# Patient Record
Sex: FEMALE | Race: WHITE | NOT HISPANIC OR LATINO | Employment: UNEMPLOYED | URBAN - METROPOLITAN AREA
[De-identification: names, ages, dates, MRNs, and addresses within clinical notes are randomized per-mention and may not be internally consistent; named-entity substitution may affect disease eponyms.]

---
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
## 2024-10-01 PROBLEM — E66.3 OVERWEIGHT: Status: ACTIVE | Noted: 2019-11-24

## 2024-11-10 ENCOUNTER — HEALTH MAINTENANCE LETTER (OUTPATIENT)
Age: 19
End: 2024-11-10

## 2025-05-12 NOTE — PROGRESS NOTES
SUBJECTIVE:                                                    Reina Hutchinson is a 12 year old female, here for a routine health maintenance visit,   accompanied by her mother and sister.    Patient was roomed by: Marybel Knapp CMA    Do you have any forms to be completed?  YES    SOCIAL HISTORY  Family members in house: mother and father and 2 sisters  Language(s) spoken at home: English  Recent family changes/social stressors: none noted    SAFETY/HEALTH RISKS  TB exposure:  No  Cardiac risk assessment: none  Do you monitor your child's screen use?  Yes    DENTAL  Dental health HIGH risk factors: none  Water source:  WELL WATER          SPORTS QUESTIONNAIRE:  ======================   School: [employer]                           Grade: 8th                   Sports: Volleyball and basketball  1. no - Has a doctor ever denied or restricted your participation in sports for any reason or told you to give up sports?  2. no - Do you have an ongoing medical condition (like diabetes,asthma, anemia, infections)?    3. no - Are you currently taking any prescription or nonprescription (over-the-counter) medicines or pills?    4. no - Do you have allergies to medicines, pollens, foods or stinging insects?    5. no - Have you ever spent a night in a hospital?   6. no - Have you ever had surgery?   7. no - Have you ever passed out or nearly passed out DURING exercise?   8. no - Have you ever passed out or nearly passed out AFTER exercise?   9. no - Have you ever had discomfort, pain, tightness, or pressure in your chest during exercise?   10.. no - Does your heart race or skip beats (irregular beats) during exercise?   11. no - Has a doctor ever told you that you have High Blood Pressure, a Heart Murmur, High Cholesterol, a Heart Infection, Rheumatic Fever or Kawasaki's Disease?    12. no - Has a doctor ever ordered a test for your heart? (example, ECG/EKG, Echocardiogram, stress test)  13. no -Do you get lightheaded or feel more  No care due was identified.  [facility] Embedded Care Due Messages. Reference number: [id number].   5/11/2025 7:37:57 PM CDT   short of breath than expected during exercise?   14. no- Have you ever had an unexplained seizure?   15. no -  Do you get tired or short of breath more quickly than your friends do during exercise?    16. no- Has any family member or relative  of heart problems or had an unexpected or unexplained sudden death before age 50 (including unexplained drowning, unexplained car accident or sudden infant death syndrome)?  17. no - Does anyone in your family have hypertrophic cardiomyopathy, Marfan syndrome, arrhythmogenic right ventricular cardiomyopathy, long QT syndrome, short QT syndrome, Brugada syndrome, or catecholaminergic polymorphic ventricular tachycardia?  18. no - Does anyone in your family have a heart problem, pacemaker, or implanted defibrillator?  19 yes- Has anyone in your family had an unexplained fainting, unexplained seizures, or near drowning ?   20. no - Have you ever had an injury, like a sprain, muscle or ligament tear or tendonitis, that caused you to miss a practice or game?   21. no - Have you had any broken or fractured bones, or dislocated joints?   22. no - Have you had an injury that required x-rays, MRI, CT, surgery, injections, therapy, a brace, a cast, or crutches?    23. no - Have you ever had a stress fracture?   24. no - Have you ever been told that you have or have you had an x-ray for neck instability or atlantoaxial instability? (Down syndrome or dwarfism)  25. no - Do you regularly use a brace, orthotics or other assistive device?    26. no -Do you have a bone, muscle or joint injury that bothers you ?  27. no- Do any of your joints become painful, swollen, feel warm or look red?   28. no- Do you have a history of juvenile arthritis or connective tissue disease?   29. no - Has a doctor ever told you that you have asthma or allergies?   30. no - Do you cough, wheeze, have chest tightness, or have difficulty breathing during or after exercise?    31. no - Is there anyone in your  family who has asthma?    32. no - Have you ever used an inhaler or taken asthma medicine?   33. no - Do you develop a rash or hives when you exercise?   34. no - Were you born without or are you missing a kidney, an eye, a testicle (males), or any other organ?  35. no- Do you have groin pain or a painful bulge or hernia in the groin area?   36. no - Have you had infectious mononucleosis (mono) within the last month?   37. no - Do you have any rashes, pressure sores, or other skin problems?   38. no - Have you had a herpes or MRSA  skin infection?   39. no - Have you ever had a head injury or concussion?   40. no - Have you ever had a hit or blow to the head that caused confusion, prolonged headaches or memory problems?    41. no - Do you have a history of seizure disorder?    42. no - Do you have headaches with exercise?   43. no - Have you ever had numbness, tingling or weakness in your arms or legs after being hit or falling?   44. no - Have you ever been unable to move your arms or legs after being hit or falling?   45. no - Have you ever become ill when exercising in the heat?    46. no -Do you get frequent muscle cramps when exercising?   47. no - Do you or someone in your family have sickle cell trait or disease?   48. no - Have you had any problems with your eyes or vision?   49. no- Have you had any eye injuries?   50. no - Do you wear glasses or contact lenses?    51. no - Do you wear protective eyewear, such as goggles or a face shield?  52. no - Do you worry about your weight?    53. no - Are you trying to or has anyone recommended that you gain or lose weight?    54. no - Are you on a special diet or do you avoid certain types of foods?   55. no - Have you ever had an eating disorder?  56. no - Do you have any concerns that you would like to discuss with a doctor?   57. YES - Have you ever had a menstrual period?  58. How old were you when you had your first menstrual period? ?   59. How many menstrual  periods have you had in the last year? 12      VISION   No corrective lenses  Tool used: OREN  Right eye: 10/10 (20/20)  Left eye: 10/10 (20/20)  Visual Acuity: Pass      Vision Assessment: normal        HEARING  Right Ear:       500 Hz: RESPONSE- on Level:   20 db    1000 Hz: RESPONSE- on Level:   20 db    2000 Hz: RESPONSE- on Level:   20 db    4000 Hz: RESPONSE- on Level:   20 db   Left Ear:       500 Hz: RESPONSE- on Level:   20 db    1000 Hz: RESPONSE- on Level:   20 db    2000 Hz: RESPONSE- on Level:   20 db    4000 Hz: RESPONSE- on Level:   20 db   Question Validity: no  Hearing Assessment: normal      QUESTIONS/CONCERNS: weight    PROBLEM LIST  Patient Active Problem List   Diagnosis     Nonorganic enuresis     Molluscum contagiosum     Obesity     MEDICATIONS  No current outpatient prescriptions on file.      ALLERGY  No Known Allergies    IMMUNIZATIONS  Immunization History   Administered Date(s) Administered     Comvax (HIB/HepB) 2004, 2004     DTAP (<7y) 2004, 2004, 2004, 05/27/2008     HPVQuadrivalent 10/30/2015, 09/01/2016     HepB-Peds 2004, 2004, 04/25/2005     Hepatitis A Vac Ped/Adol-2 Dose 11/10/2008, 10/28/2010     Influenza (H1N1) 11/16/2009     Influenza (IIV3) 2004, 11/10/2008, 10/28/2010     Influenza Vaccine IM 3yrs+ 4 Valent IIV4 10/30/2015     MMR 04/25/2005, 05/27/2008     Meningococcal (Menactra ) 10/30/2015     Pneumococcal (PCV 7) 2004, 2004, 2004, 10/06/2005     Poliovirus, inactivated (IPV) 2004, 2004, 04/25/2005, 05/27/2008     TDAP Vaccine (Adacel) 10/30/2015     TRIHIBIT (DTAP/HIB, <7y) 10/06/2005     Varicella Pt Report Hx of Varicella/Chicken Pox 02/07/2007       HEALTH HISTORY SINCE LAST VISIT  No surgery, major illness or injury since last physical exam    HOME  No concerns    EDUCATION  School:  [employer]  Grade: 8th  School performance / Academic skills: doing well in school  Concerns:  "no    SAFETY  Car seat belt always worn:  Yes  Helmet worn for bicycle/roller blades/skateboard?  NO    Guns/firearms in the home: YES, Trigger locks present? YES, Ammunition separate from firearm: YES  No safety concerns    ACTIVITIES  Do you get at least 60 minutes per day of physical activity, including time in and out of school: Yes  Free time:  Outside activities  Physical activity: Mowing, walks, swimming    ELECTRONIC MEDIA  < 2 hours/ day    DIET  Do you get at least 4 helpings of a fruit or vegetable every day: Yes  How many servings of juice, non-diet soda, punch or sports drinks per day: occ  Meals:  She has been working on smaller portion sizes with her mother. They have been trying to eat better and eat healthier snacks. They have been growing their own fruits and vegetables and try to eat those as snacks.    and Body image/shape: She recognizes that she is overweight and it working on making better food choices to lose weight.     ============================================================    SLEEP  No concerns, sleeps well through night    DRUGS  Smoking:  no  Passive smoke exposure:  YES--parents  Alcohol:  no  Drugs:  no    SEXUALITY  Sexual activity: No    PSYCHO-SOCIAL/DEPRESSION  General screening:  Pediatric Symptom Checklist-Youth PASS (score 17--<30 pass), no followup necessary  No concerns      ROS  GENERAL: See health history, nutrition and daily activities. Overweight    SKIN: No  rash, hives or significant lesions  HEENT: Hearing/vision: see above.  No eye, nasal, ear symptoms.  RESP: No cough or other concerns  CV: No concerns  GI: See nutrition and elimination.  No concerns.  : See elimination. No concerns  NEURO: No headaches or concerns.    OBJECTIVE:                                                    EXAMBP 106/64  Pulse 80  Temp 98.1  F (36.7  C) (Tympanic)  Resp 14  Ht 5' 3.25\" (1.607 m)  Wt 184 lb (83.5 kg)  LMP 07/28/2017  Breastfeeding? No  BMI 32.34 kg/m2  64 %ile " based on CDC 2-20 Years stature-for-age data using vitals from 7/31/2017.  99 %ile based on CDC 2-20 Years weight-for-age data using vitals from 7/31/2017.  99 %ile based on CDC 2-20 Years BMI-for-age data using vitals from 7/31/2017.  Blood pressure percentiles are 39.5 % systolic and 48.7 % diastolic based on NHBPEP's 4th Report.   GENERAL: Active, alert, in no acute distress.Overweight.  SKIN: Clear. No significant rash, abnormal pigmentation or lesions. Scattered skin tags around neck, axilla.   HEAD: Normocephalic  EYES: Pupils equal, round, reactive, Extraocular muscles intact. Normal conjunctivae.  EARS: Normal canals. Tympanic membranes are normal; gray and translucent.  NOSE: Normal without discharge.  MOUTH/THROAT: Clear. No oral lesions. Teeth without obvious abnormalities.  NECK: Supple, no masses.  No thyromegaly.  LYMPH NODES: No adenopathy  LUNGS: Clear. No rales, rhonchi, wheezing or retractions  HEART: Regular rhythm. Normal S1/S2. No murmurs. Normal pulses.  ABDOMEN: Soft, non-tender, not distended, no masses or hepatosplenomegaly. Bowel sounds normal.   NEUROLOGIC: No focal findings. Cranial nerves grossly intact: DTR's normal. Normal gait, strength and tone  BACK: Spine is straight, no scoliosis.  EXTREMITIES: Full range of motion, no deformities  : Exam deferred.  SPORTS EXAM:        Shoulder:  normal    Elbow:  normal    Hand/Wrist:  normal    Back:  normal    Quad/Ham:  normal    Knee:  normal    Ankle/Feet:  normal    Heel/Toe:  normal    Duck walk:  normal    ASSESSMENT/PLAN:                                                    Reina was seen today for well child.    Diagnoses and all orders for this visit:    Encounter for routine child health examination w/o abnormal findings  -     PURE TONE HEARING TEST, AIR  -     SCREENING, VISUAL ACUITY, QUANTITATIVE, BILAT  -     BEHAVIORAL / EMOTIONAL ASSESSMENT [63517]    Obesity due to excess calories, unspecified obesity severity    offered  nutritionist referral    Anticipatory Guidance  The following topics were discussed:  SOCIAL/ FAMILY:  NUTRITION:    Healthy food choices    Family meals    Calcium    Weight management  HEALTH/ SAFETY:  SEXUALITY:    Preventive Care Plan  Immunizations    Reviewed, up to date  Referrals/Ongoing Specialty care: No   See other orders in EpicCare.  Cleared for sports:  Yes  BMI at 99 %ile based on CDC 2-20 Years BMI-for-age data using vitals from 7/31/2017.    OBESITY ACTION PLAN    Exercise and nutrition counseling performed    Dental visit recommended: Yes, Continue care every 6 months    FOLLOW-UP:     in 1-2 years for a Preventive Care visit    Resources  HPV and Cancer Prevention:  What Parents Should Know  What Kids Should Know About HPV and Cancer  Goal Tracker: Be More Active  Goal Tracker: Less Screen Time  Goal Tracker: Drink More Water  Goal Tracker: Eat More Fruits and Veggies  This document serves as a record of the services and decisions personally performed and made by Edna Santana MD. It was created on her behalf by Ritu Beltran, a trained medical scribe. The creation of this document is based the provider's statements to the medical scribe.  Ritu Beltran 10:00 AM 7/31/2017    Provider:   The information in this document, created by the medical scribe for me, accurately reflects the services I personally performed and the decisions made by me. I have reviewed and approved this document for accuracy prior to leaving the patient care area.  Edna Santana MD 10:00 AM 7/31/2017    Edna Santana MD  [facility]